# Patient Record
Sex: FEMALE | Race: WHITE
[De-identification: names, ages, dates, MRNs, and addresses within clinical notes are randomized per-mention and may not be internally consistent; named-entity substitution may affect disease eponyms.]

---

## 2018-08-17 ENCOUNTER — HOSPITAL ENCOUNTER (EMERGENCY)
Dept: HOSPITAL 14 - H.ER | Age: 33
Discharge: HOME | End: 2018-08-17
Payer: MEDICAID

## 2018-08-17 VITALS
SYSTOLIC BLOOD PRESSURE: 132 MMHG | RESPIRATION RATE: 18 BRPM | TEMPERATURE: 98.2 F | DIASTOLIC BLOOD PRESSURE: 87 MMHG | HEART RATE: 82 BPM

## 2018-08-17 VITALS — OXYGEN SATURATION: 98 %

## 2018-08-17 VITALS — BODY MASS INDEX: 33.9 KG/M2

## 2018-08-17 DIAGNOSIS — O20.9: Primary | ICD-10-CM

## 2018-08-17 LAB
BASOPHILS # BLD AUTO: 0 K/UL (ref 0–0.2)
BASOPHILS NFR BLD: 0.3 % (ref 0–2)
EOSINOPHIL # BLD AUTO: 0.1 K/UL (ref 0–0.7)
EOSINOPHIL NFR BLD: 0.5 % (ref 0–4)
ERYTHROCYTE [DISTWIDTH] IN BLOOD BY AUTOMATED COUNT: 13.6 % (ref 11.5–14.5)
HGB BLD-MCNC: 14.2 G/DL (ref 12–16)
LYMPHOCYTES # BLD AUTO: 3.1 K/UL (ref 1–4.3)
LYMPHOCYTES NFR BLD AUTO: 22.5 % (ref 20–40)
MCH RBC QN AUTO: 27.8 PG (ref 27–31)
MCHC RBC AUTO-ENTMCNC: 33 G/DL (ref 33–37)
MCV RBC AUTO: 84.1 FL (ref 81–99)
MONOCYTES # BLD: 0.6 K/UL (ref 0–0.8)
MONOCYTES NFR BLD: 4.6 % (ref 0–10)
NEUTROPHILS # BLD: 9.9 K/UL (ref 1.8–7)
NEUTROPHILS NFR BLD AUTO: 72.1 % (ref 50–75)
NRBC BLD AUTO-RTO: 0.1 % (ref 0–0)
PLATELET # BLD: 321 K/UL (ref 130–400)
PMV BLD AUTO: 8.5 FL (ref 7.2–11.7)
RBC # BLD AUTO: 5.13 MIL/UL (ref 3.8–5.2)
WBC # BLD AUTO: 13.7 K/UL (ref 4.8–10.8)

## 2018-08-17 PROCEDURE — 85025 COMPLETE CBC W/AUTO DIFF WBC: CPT

## 2018-08-17 PROCEDURE — 76817 TRANSVAGINAL US OBSTETRIC: CPT

## 2018-08-17 PROCEDURE — 87591 N.GONORRHOEAE DNA AMP PROB: CPT

## 2018-08-17 PROCEDURE — 86850 RBC ANTIBODY SCREEN: CPT

## 2018-08-17 PROCEDURE — 99284 EMERGENCY DEPT VISIT MOD MDM: CPT

## 2018-08-17 PROCEDURE — 86900 BLOOD TYPING SEROLOGIC ABO: CPT

## 2018-08-17 PROCEDURE — 87491 CHLMYD TRACH DNA AMP PROBE: CPT

## 2018-08-17 PROCEDURE — 84702 CHORIONIC GONADOTROPIN TEST: CPT

## 2018-08-17 NOTE — ED PDOC
HPI: Female  Pain


Time Seen by Provider: 18 19:13


Chief Complaint (Nursing): Female Genitourinary


Chief Complaint (Provider): Female Genitourinary


History Per: Patient


History/Exam Limitations: no limitations


Onset/Duration Of Symptoms: Days (x1)


Current Symptoms Are (Timing): Still Present


Additional Complaint(s): 





32 year old female with no significant past medical history presents to the ED 

with vaginal spotting associated left pelvic pain. Patient reports she had 

brown vaginal discharge this morning that resolved. She took a pregnancy test, 

had a positive result, prompting ED visit for further evaluation. Her LNMP was 

, she notes her menstrual cycle is irregular. Patient reports this is 

her first pregnancy and she has not started prenatal care. She states she has 

had some breast tenderness last week, but denies nausea, vomiting, morning 

sickness, dysuria, frequency, fatigue or any other medical complaints. 








PMD: none provided





Past Medical History


Reviewed: Historical Data, Nursing Documentation, Vital Signs


Vital Signs: 





 Last Vital Signs











Temp  98.5 F   18 18:52


 


Pulse  90   18 18:52


 


Resp  16   18 18:52


 


BP  147/92 H  18 18:52


 


Pulse Ox  98   18 18:52














- Medical History


PMH: No Chronic Diseases


   Denies: Chronic Kidney Disease





- Surgical History


Other surgeries: right ankle surgery





- Family History


Family History: States: Diabetes





- Social History


Current smoker - smoking cessation education provided: No


Ex-Smoker (has not smoked in the last 12 months): No


Alcohol: Occasional


Drugs: Denies





- Home Medications


Home Medications: 


 Ambulatory Orders











 Medication  Instructions  Recorded


 


Meclizine [Antivert] 2 tab PO TID PRN #15 tab 16


 


oxyCODONE/Acetaminophen [Percocet 1 tab PO QID PRN #14 tab 16





5/325 mg Tab]  


 


Naproxen [Naprosyn] 1 tab PO BID PRN #60 tab 16


 


Ondansetron [Zofran] 4 mg PO Q8H PRN #30 tab 16


 


Prenatal Multivit/Folic Acid/I 1 tab PO DAILY #100 tab 18





[Prenatal Plus]  














- Allergies


Allergies/Adverse Reactions: 


 Allergies











Allergy/AdvReac Type Severity Reaction Status Date / Time


 


No Known Allergies Allergy   Verified 16 14:50














Review of Systems


ROS Statement: Except As Marked, All Systems Reviewed And Found Negative


Gastrointestinal: Negative for: Nausea, Vomiting


Genitourinary Female: Positive for: Vaginal Discharge (brown), Vaginal Bleeding 

(spotting), Pelvic Pain (left).  Negative for: Dysuria, Frequency





Physical Exam





- Reviewed


Nursing Documentation Reviewed: Yes


Vital Signs Reviewed: Yes





- Physical Exam


Appears: Positive for: Well, No Acute Distress


Head Exam: Positive for: ATRAUMATIC, NORMOCEPHALIC


Skin: Positive for: Normal Color, Warm, Dry


Eye Exam: Positive for: Normal appearance, EOMI, PERRL


Cardiovascular/Chest: Positive for: Regular Rate, Rhythm.  Negative for: Murmur


Gastrointestinal/Abdominal: Positive for: Soft, Tenderness (LLQ), Other (

Negative McBurneys point tenderness and negative Ryan sign ).  Negative for

: Mass, Guarding, Rebound


Back: Positive for: Normal Inspection.  Negative for: Vertebral Tenderness


Extremity: Positive for: Normal ROM.  Negative for: Deformity


Lymphatic: Negative for: Adenopathy


Neurologic/Psych: Positive for: Alert.  Negative for: Motor/Sensory Deficits





- Laboratory Results


Result Diagrams: 


 18 19:50








- ECG


O2 Sat by Pulse Oximetry: 98 (RA)


Pulse Ox Interpretation: Normal





Medical Decision Making


Medical Decision Making: 





Time: 19:13


Initial Impression: vaginal bleeding and pain in early pregnancy


Differential diagnoses include but are not limited to: uti, ectopic pregnancy, 

cystitis, threatened miscarriage and vaginitis


Initla Plan:


--Type and screen


--Beta-HCG


--Urine preg


--Urine dip


--CBC with differentials 


--Chlamydia


--Lacatated ringers


--OB transvaginal US





Time: 22:08


OB transvaginal US


FINDINGS:


Gestation: No intrauterine gestational sac.


Uterus/cervix: Endometrium: 1.4 cm in thickness. Closed cervix.


Ovaries: RIGHT ovary: Normal. LEFT ovary: Probable 2.0 x 1.9 x 1.2 cm corpus 

luteal cyst. No


adnexal masses.


Free fluid: No significant free fluid.


IMPRESSION:


1. No intrauterine gestation. DDX: Early IUP, missed , ectopic 

pregnancy.


2. Incidental/non-acute findings are described above.


 serum beta hcg 55


Blood type O+





DW pt findings. Dx most likely threatend miscarriage or early IUP. Pt still 

needs follow up to r/o ectopic. RTER 48 hours for reevaluation. Bleeding 

precautions.





--------------------------------------------------------------------------------

-----------------


Scribe Attestation:


Documented by Kim Moses, acting as a scribe for Andie Ramirez MD





Provider Scribe Attestation:


All medical record entries made by the Scribe were at my direction and 

personally dictated by me. I have reviewed the chart and agree that the record 

accurately reflects my personal performance of the history, physical exam, 

medical decision making, and the department course for this patient. I have 

also personally directed, reviewed, and agree with the discharge instructions 

and disposition.





Disposition





- Clinical Impression


Clinical Impression: 


 Vaginal bleeding in pregnancy





Counseled Patient/Family Regarding: Studies Performed, Diagnosis, Need For 

Followup





- Disposition


Referrals: 


[facility] ([facility] 

[facility])


Disposition: Routine/Home


Disposition Time: 22:34


Condition: STABLE


Additional Instructions: 


GRANDE EMBARAZO ES DEMASIADO TEMPRANO PARA DETECTAR UN FETO





REGRESA A LA ADWOA DE EMERGENCIA EN 48 HORAS A REPITAR GRANDE ANALISIS DE LUCILA





REGRESA A LA ADWOA DE EMERGENCIA INMEDIATO:


--SI USTED SIENTE MUCHO DOLOR


--SI USTED  ESTA PERDIO CONSONIMIENTO


--SI USTED ESTA SANGRANDO MAS QUE EUSEBIA KOTEX CADA EUSEBIA HORA





Prescriptions: 


Prenatal Multivit/Folic Acid/I [Prenatal Plus] 1 tab PO DAILY #100 tab


Instructions:  Threatened Miscarriage (DC), Bleeding With Pregnancy (DC)


Print Language: Turkmen

## 2018-08-18 NOTE — US
PROCEDURE:  OB Pelvic Ultrasound



HISTORY:

vaginal bleeding early preg r/o ectopic



COMPARISON:

None available.



FINDINGS:



UTERUS:

No intrauterine gestation identified. 







Uterus measures  cm. No mass 



Endometrium measures 14 mm in width. 



CERVIX:

Long and closed. No cervical abnormality seen.



RIGHT OVARY:

Measures 2.2 x 1.3 x 2.3 cm. No mass. Normal flow. 



LEFT OVARY:

Measures 2.5 x 2.4 x 1.8 cm. No mass. Normal flow. 



FREE FLUID:

None.



OTHER FINDINGS:

None. 



IMPRESSION:

No intrauterine gestation. Cannot rule out ectopic gestation in the 

absence of demonstrated intrauterine gestation.  Correlate with 

serial beta HCG and follow-up transvaginal pelvic ultrasound 

examination.

## 2018-11-06 ENCOUNTER — HOSPITAL ENCOUNTER (EMERGENCY)
Dept: HOSPITAL 14 - H.ER | Age: 33
Discharge: LEFT BEFORE DISCHARGE | End: 2018-11-06
Payer: MEDICAID

## 2018-11-06 VITALS
DIASTOLIC BLOOD PRESSURE: 77 MMHG | HEART RATE: 80 BPM | OXYGEN SATURATION: 100 % | SYSTOLIC BLOOD PRESSURE: 128 MMHG | TEMPERATURE: 98.4 F | RESPIRATION RATE: 18 BRPM

## 2018-11-06 VITALS — BODY MASS INDEX: 33.9 KG/M2

## 2018-11-06 DIAGNOSIS — Z02.89: Primary | ICD-10-CM

## 2019-01-16 ENCOUNTER — HOSPITAL ENCOUNTER (EMERGENCY)
Dept: HOSPITAL 14 - H.ER | Age: 34
Discharge: HOME | End: 2019-01-16
Payer: COMMERCIAL

## 2019-01-16 VITALS
TEMPERATURE: 98.3 F | OXYGEN SATURATION: 98 % | RESPIRATION RATE: 16 BRPM | DIASTOLIC BLOOD PRESSURE: 71 MMHG | HEART RATE: 97 BPM | SYSTOLIC BLOOD PRESSURE: 141 MMHG

## 2019-01-16 VITALS — BODY MASS INDEX: 33.9 KG/M2

## 2019-01-16 DIAGNOSIS — G89.29: ICD-10-CM

## 2019-01-16 DIAGNOSIS — M54.9: Primary | ICD-10-CM

## 2019-03-08 ENCOUNTER — HOSPITAL ENCOUNTER (EMERGENCY)
Dept: HOSPITAL 14 - H.EROB2 | Age: 34
Discharge: HOME | End: 2019-03-08
Payer: COMMERCIAL

## 2019-03-08 VITALS — BODY MASS INDEX: 34.7 KG/M2

## 2019-03-08 DIAGNOSIS — O26.92: ICD-10-CM

## 2019-03-08 DIAGNOSIS — Z3A.32: ICD-10-CM

## 2019-03-08 DIAGNOSIS — O47.03: Primary | ICD-10-CM

## 2019-03-08 DIAGNOSIS — R10.2: ICD-10-CM

## 2019-03-08 LAB
BILIRUB UR-MCNC: NEGATIVE MG/DL
COLOR UR: YELLOW
GLUCOSE UR STRIP-MCNC: (no result) MG/DL
LEUKOCYTE ESTERASE UR-ACNC: (no result) LEU/UL
PH UR STRIP: 7 [PH] (ref 5–8)
PROT UR STRIP-MCNC: NEGATIVE MG/DL
RBC # UR STRIP: NEGATIVE /UL
SP GR UR STRIP: 1.01 (ref 1–1.03)
SQUAMOUS EPITHIAL: 13 /HPF (ref 0–5)
URINE CLARITY: (no result)
UROBILINOGEN UR-MCNC: (no result) MG/DL (ref 0.2–1)

## 2019-03-08 NOTE — OBHP
===========================

Datetime: 2019 20:51

===========================

   

IP Adm Impression:  , intrauterine pregnancy

IP Admit Plan:  Observation/Evaluation; Discharge home

Admit Comment, IP Provider:  PNP: Baldemar Moore

   34 y/o  @ 32.5 wks CRISTINA 19 based on LMP: 19 c/o intermittent ctx since 10am this morn
ing. She endorses +FM. She denies any complications in this pregnancy, loss of fluid, vb or dysuria.

   OBGYNhx: denies

   PMH: PCOS

   Meds: PNV

   Allergies: denies

   Surghx: ORIF right leg

   Famhx: Mother _ father hx

   Sochx: denies tobacco, EtoH or elicit drug use

      

   PE:

   Gen: Obese female laying upright in bed breathing comfortably

   Cardio: s1s2 RRR

   Lungs: cta b/l

   Abd: gravid, soft, BS+, nontender, no rigidity no guarding

   Pelvic: speculum- no pooling, cervix closed

   Ext: calves nontender

      

   A/P: 34 y/o , clinically stable @ 32.5 wks CRISTINA 19 c/o intermittent ctx since 10am.

   -Patient's exam consistent with patient not in labor. 

   -Continue FHR monitoring

   -FU UA

      

   ****************************************

   UA negative; patient discharged home

   Patient seen and examined with Dr. Rosamaria Reza, PGY-1, FM

      

   OB Hospitalist Addendum: 32 yo G1 at 32+ 5 wks c/o ctxns, now feeling better.  NST reactive.  Pt d
ischarged home w/  labor precautions. Pt has an appoint w/ [facility] tomorrow. (ES) 
 

Gestation - Est Wks by US:  32.5

Pool Provider:  Negative

IP Prenatal Hx Assessment:  The Prenatal History has been Reviewed and is Current

EGA AdmitDate IP:  32.5

Vital Signs Provider:  Reviewed; Within Normal Limits

IP Chief Complaint:  Uterine contractions

NICHD Variability Prov Fetus A:  Moderate 6-25bpm

NICHD Accel Fetus A IP Provider:  15X15

Dilatation, Provider:  0

Effacement, Provider:  0

Station, Provider:  -3

## 2019-03-09 VITALS — DIASTOLIC BLOOD PRESSURE: 79 MMHG | HEART RATE: 95 BPM | TEMPERATURE: 98.5 F | SYSTOLIC BLOOD PRESSURE: 123 MMHG

## 2019-04-29 ENCOUNTER — HOSPITAL ENCOUNTER (INPATIENT)
Dept: HOSPITAL 31 - C.4D | Age: 34
LOS: 3 days | Discharge: HOME | DRG: 373 | End: 2019-05-02
Attending: OBSTETRICS & GYNECOLOGY | Admitting: OBSTETRICS & GYNECOLOGY
Payer: COMMERCIAL

## 2019-04-29 VITALS — BODY MASS INDEX: 38.7 KG/M2

## 2019-04-29 DIAGNOSIS — O48.0: ICD-10-CM

## 2019-04-29 DIAGNOSIS — Z3A.40: ICD-10-CM

## 2019-04-29 LAB
ALBUMIN SERPL-MCNC: 3.7 G/DL (ref 3.5–5)
ALBUMIN/GLOB SERPL: 1 {RATIO} (ref 1–2.1)
ALT SERPL-CCNC: 18 U/L (ref 9–52)
AST SERPL-CCNC: 37 U/L (ref 14–36)
BACTERIA #/AREA URNS HPF: (no result) /[HPF]
BASOPHILS # BLD AUTO: 0 K/UL (ref 0–0.2)
BASOPHILS NFR BLD: 0.2 % (ref 0–2)
BILIRUB UR-MCNC: NEGATIVE MG/DL
BUN SERPL-MCNC: 8 MG/DL (ref 7–17)
CALCIUM SERPL-MCNC: 9.3 MG/DL (ref 8.6–10.4)
COLOR UR: YELLOW
EOSINOPHIL # BLD AUTO: 0 K/UL (ref 0–0.7)
EOSINOPHIL NFR BLD: 0.3 % (ref 0–4)
ERYTHROCYTE [DISTWIDTH] IN BLOOD BY AUTOMATED COUNT: 14.2 % (ref 11.5–14.5)
GFR NON-AFRICAN AMERICAN: > 60
GLUCOSE UR STRIP-MCNC: NEGATIVE MG/DL
HGB BLD-MCNC: 13 G/DL (ref 11–16)
LEUKOCYTE ESTERASE UR-ACNC: NEGATIVE LEU/UL
LYMPHOCYTES # BLD AUTO: 1.7 K/UL (ref 1–4.3)
LYMPHOCYTES NFR BLD AUTO: 17.4 % (ref 20–40)
MCH RBC QN AUTO: 28.4 PG (ref 27–31)
MCHC RBC AUTO-ENTMCNC: 33.3 G/DL (ref 33–37)
MCV RBC AUTO: 85.5 FL (ref 81–99)
MONOCYTES # BLD: 0.5 K/UL (ref 0–0.8)
MONOCYTES NFR BLD: 4.8 % (ref 0–10)
NEUTROPHILS # BLD: 7.6 K/UL (ref 1.8–7)
NEUTROPHILS NFR BLD AUTO: 77.3 % (ref 50–75)
NRBC BLD AUTO-RTO: 0 % (ref 0–2)
PH UR STRIP: 6 [PH] (ref 5–8)
PLATELET # BLD: 254 K/UL (ref 130–400)
PMV BLD AUTO: 9.7 FL (ref 7.2–11.7)
PROT UR STRIP-MCNC: NEGATIVE MG/DL
RBC # BLD AUTO: 4.57 MIL/UL (ref 3.8–5.2)
RBC # UR STRIP: NEGATIVE /UL
SP GR UR STRIP: 1.02 (ref 1–1.03)
SQUAMOUS EPITHIAL: 5 /HPF (ref 0–5)
UROBILINOGEN UR-MCNC: 1 MG/DL (ref 0.2–1)
WBC # BLD AUTO: 9.8 K/UL (ref 4.8–10.8)

## 2019-04-30 LAB
ERYTHROCYTE [DISTWIDTH] IN BLOOD BY AUTOMATED COUNT: 13.5 % (ref 11.5–14.5)
HGB BLD-MCNC: 11.6 G/DL (ref 11–16)
MCH RBC QN AUTO: 28.4 PG (ref 27–31)
MCHC RBC AUTO-ENTMCNC: 33.3 G/DL (ref 33–37)
MCV RBC AUTO: 85.1 FL (ref 81–99)
PLATELET # BLD: 276 K/UL (ref 130–400)
PMV BLD AUTO: 9.6 FL (ref 7.2–11.7)
RBC # BLD AUTO: 4.07 MIL/UL (ref 3.8–5.2)
WBC # BLD AUTO: 20.2 K/UL (ref 4.8–10.8)

## 2019-04-30 PROCEDURE — 0KQM0ZZ REPAIR PERINEUM MUSCLE, OPEN APPROACH: ICD-10-PCS | Performed by: OBSTETRICS & GYNECOLOGY

## 2019-04-30 RX ADMIN — PENICILLIN G POTASSIUM SCH MLS/HR: 5000000 INJECTION, POWDER, FOR SOLUTION INTRAMUSCULAR; INTRAVENOUS at 10:16

## 2019-04-30 RX ADMIN — PENICILLIN G POTASSIUM SCH MLS/HR: 5000000 INJECTION, POWDER, FOR SOLUTION INTRAMUSCULAR; INTRAVENOUS at 02:30

## 2019-04-30 RX ADMIN — Medication SCH TAB: at 17:33

## 2019-04-30 RX ADMIN — PENICILLIN G POTASSIUM SCH MLS/HR: 5000000 INJECTION, POWDER, FOR SOLUTION INTRAMUSCULAR; INTRAVENOUS at 06:30

## 2019-05-01 LAB
ERYTHROCYTE [DISTWIDTH] IN BLOOD BY AUTOMATED COUNT: 13.7 % (ref 11.5–14.5)
HGB BLD-MCNC: 9.6 G/DL (ref 11–16)
MCH RBC QN AUTO: 28.3 PG (ref 27–31)
MCHC RBC AUTO-ENTMCNC: 33 G/DL (ref 33–37)
MCV RBC AUTO: 85.6 FL (ref 81–99)
PLATELET # BLD: 235 K/UL (ref 130–400)
PMV BLD AUTO: 9.1 FL (ref 7.2–11.7)
RBC # BLD AUTO: 3.39 MIL/UL (ref 3.8–5.2)
WBC # BLD AUTO: 13.9 K/UL (ref 4.8–10.8)

## 2019-05-01 RX ADMIN — Medication SCH TAB: at 09:33

## 2019-05-02 VITALS — HEART RATE: 102 BPM | TEMPERATURE: 98.2 F | RESPIRATION RATE: 20 BRPM

## 2019-05-02 VITALS — DIASTOLIC BLOOD PRESSURE: 81 MMHG | SYSTOLIC BLOOD PRESSURE: 133 MMHG | OXYGEN SATURATION: 99 %

## 2019-05-02 RX ADMIN — Medication SCH TAB: at 09:23
